# Patient Record
Sex: FEMALE
[De-identification: names, ages, dates, MRNs, and addresses within clinical notes are randomized per-mention and may not be internally consistent; named-entity substitution may affect disease eponyms.]

---

## 2017-02-20 ENCOUNTER — HOSPITAL ENCOUNTER (OUTPATIENT)
Dept: HOSPITAL 5 - SPVIMAG | Age: 77
Discharge: HOME | End: 2017-02-20
Attending: SURGERY
Payer: MEDICARE

## 2017-02-20 DIAGNOSIS — Z85.3: Primary | ICD-10-CM

## 2017-02-20 PROCEDURE — A9577 INJ MULTIHANCE: HCPCS

## 2017-02-20 PROCEDURE — 0159T: CPT

## 2017-02-20 PROCEDURE — C8908 MRI W/O FOL W/CONT, BREAST,: HCPCS

## 2017-02-20 PROCEDURE — 82962 GLUCOSE BLOOD TEST: CPT

## 2017-02-20 PROCEDURE — 77059: CPT

## 2017-02-22 NOTE — MAGNETIC RESONANCE REPORT
BILATERAL BREAST MRI WITHOUT AND WITH CONTRAST: 02/20/17 09:28:00



CLINICAL: Breast cancer survivor status post left partial mastectomy.



COMPARISON:None..



TECHNIQUE: Axial 1.0-mm T1 without,  axial high resolution 2.0-mm T2 

and axial 1.0-mm dynamic Vibrant high-resolution postcontrast T1 fat 

saturation sequences on a 1.5 Brissa magnet.  The examination was 

performed with an 8 channel dedicated [employer] breast coil. Post 

processing with CAD and subtraction was performed on an [employer] 

workstation.  20 cc of Multihance was injected without incident for the 

contrast portion of the exam. Consent was obtained prior to the 

administration of the contrast.  



FINDINGS:



Right: Minimal background parenchymal enhancement.  No mass or 

suspicious enhancement.  No suspicious right axillary or right internal 

mammary lymph nodes.



Left: Minimal background parenchymal enhancement.  The left breast is 

smaller than the right with moderate skin thickening.  No mass or 

suspicious enhancement.  Benign minimally enhancing scar in the outer 

breast.  An intramammary lymph node in the lower outer quadrant 

measures 1 cm maximum dimension.  It has central fat and benign 

morphology.  No suspicious left axillary or left internal mammary lymph 

nodes.



IMPRESSION: Status post left partial mastectomy.  No suspicious finding 

in either breast.  Recommend routine mammographic screening.



BI-RADS  2 -- Benign

## 2018-02-22 ENCOUNTER — HOSPITAL ENCOUNTER (OUTPATIENT)
Dept: HOSPITAL 5 - SPVIMAG | Age: 78
Discharge: HOME | End: 2018-02-22
Attending: SURGERY
Payer: MEDICARE

## 2018-02-22 DIAGNOSIS — Z90.12: ICD-10-CM

## 2018-02-22 DIAGNOSIS — N60.81: Primary | ICD-10-CM

## 2018-02-22 DIAGNOSIS — R60.9: ICD-10-CM

## 2018-02-22 DIAGNOSIS — Z85.3: ICD-10-CM

## 2018-02-22 PROCEDURE — A9577 INJ MULTIHANCE: HCPCS

## 2018-02-22 PROCEDURE — C8908 MRI W/O FOL W/CONT, BREAST,: HCPCS

## 2018-02-22 PROCEDURE — 77059: CPT

## 2018-02-26 NOTE — MAGNETIC RESONANCE REPORT
BILATERAL BREAST MRI WITHOUT AND WITH CONTRAST: 02/22/18 13:49:00



CLINICAL: Breast cancer survivor is status post left partial mastectomy 

and radiation therapy in 2015 for intraductal carcinoma.  History of 

right breast ALH.



COMPARISON:02/20/17 MRI and 08/10/17 bilateral mammogram..



TECHNIQUE: Axial 1.0-mm T1 without,  axial high resolution 2.0-mm T2 

and axial 1.0-mm dynamic Vibrant high-resolution postcontrast T1 fat 

saturation sequences on a 1.5 Brissa magnet.  The examination was 

performed with an 8 channel dedicated [place] breast coil. Post 

processing with CAD and subtraction was performed on an [employer] 

workstation.  15.0 cc of Multihance was injected without incident via a 

left antecubital vein 22-gauge INT for the contrast portion of the 

exam. Consent was obtained prior to the administration of the contrast. 

 



FINDINGS:



Right: Minimal background parenchymal enhancement.  No mass or 

suspicious enhancement.  No suspicious right axillary or right internal 

mammary lymph nodes.



Left: Minimal background parenchymal enhancement.  The left breast is 

smaller than the right.  Stable moderate skin thickening and edema of 

the breast.  No mass or suspicious enhancement.  A stable 1 cm lower 

outer quadrant intramammary lymph node with benign morphology.  No 

suspicious left axillary or left internal mammary lymph nodes.



IMPRESSION: Negative study with no suspicious finding.  Stable post 

radiation skin thickening and edema of the left breast.



BI-RADS  2 -- Benign

## 2021-04-06 ENCOUNTER — HOSPITAL ENCOUNTER (OUTPATIENT)
Dept: HOSPITAL 5 - SPVWC | Age: 81
Discharge: HOME | End: 2021-04-06
Attending: SURGERY
Payer: MEDICARE

## 2021-04-06 DIAGNOSIS — R92.1: Primary | ICD-10-CM

## 2021-04-06 NOTE — MAMMOGRAPHY REPORT
DIGITAL DIAGNOSTIC MAMMOGRAM WITH CAD CONVENTIONAL, 4/6/2021



CLINICAL INFORMATION / INDICATION: Follow-up left breast calcifications. Abnormal mammogram.



TECHNIQUE:  Digital left mammographic imaging was performed. Magnification views were obtained.

This examination was interpreted with the benefit of Computer-aided Detection analysis. 



COMPARISON: 10/21/20.



FINDINGS: 



Breast Density: There are scattered areas of fibroglandular density.



Post lumpectomy/radiation changes in the left upper outer quadrant are again noted.. 



Grouped calcifications in the left upper outer quadrant posteriorly are again identified. The calcifi
cations are faint/amorphous and tightly packed. There is an associated 6 mm nodule at that site. The 
calcifications are not peripheral on magnification views and do not layer on the true lateral view.



IMPRESSION: Small area of grouped calcifications are associated with a nodular density and are not cl
assic for fat necrosis. The findings are moderately suspicious and biopsy is recommended.



Follow up recommendation: Biopsy



BI-RADS Category 4: Suspicious for Malignancy. 



-------------------------------------------------------------------------------------------

A "normal" or negative report should not discourage follow up or biopsy of a clinically significant f
inding.



A written summary of these findings will be mailed to the patient. The patient will be entered into a
 mammography reporting system which will generate a reminder letter for the patient's next appointmen
t at the appropriate interval.



According to the [employer], yearly mammograms are recommended starting at age 40 
and continuing as long as a woman is in good health.  Breast MRI is recommended for women with an angela
roximately 20-25% or greater lifetime risk of breast cancer, including women with a strong family his
tory of breast or ovarian cancer and women who have been treated for Hodgkin's disease.



Signer Name: Del Mon MD 

Signed: 4/6/2021 9:37 AM

Workstation Name: [employer]